# Patient Record
Sex: MALE | Race: WHITE | NOT HISPANIC OR LATINO | Employment: OTHER | URBAN - METROPOLITAN AREA
[De-identification: names, ages, dates, MRNs, and addresses within clinical notes are randomized per-mention and may not be internally consistent; named-entity substitution may affect disease eponyms.]

---

## 2017-09-19 ENCOUNTER — TELEPHONE (OUTPATIENT)
Dept: PRIMARY CARE CLINIC | Facility: CLINIC | Age: 82
End: 2017-09-19

## 2017-09-19 NOTE — TELEPHONE ENCOUNTER
Scheduled patient for tomorrow at 11:45 and cancelled his other appt on 9/29/17.   Patient notified.

## 2017-09-19 NOTE — TELEPHONE ENCOUNTER
"----- Message from Gale Solano sent at 9/19/2017 10:14 AM CDT -----  Contact: patient  Patient, [phone], is calling regarding his 9/29/2017 appointment wit" and he fell in the kitchen.  Patient is having balance issues. Patient would like to get in to see Dr. Gonzales sooner than his scheduled appointment.  Please advise.  Thanks!  "

## 2017-09-20 ENCOUNTER — OFFICE VISIT (OUTPATIENT)
Dept: PRIMARY CARE CLINIC | Facility: CLINIC | Age: 82
End: 2017-09-20
Payer: MEDICARE

## 2017-09-20 VITALS
TEMPERATURE: 98 F | DIASTOLIC BLOOD PRESSURE: 71 MMHG | OXYGEN SATURATION: 99 % | HEIGHT: 75 IN | BODY MASS INDEX: 24.28 KG/M2 | WEIGHT: 195.31 LBS | SYSTOLIC BLOOD PRESSURE: 180 MMHG | RESPIRATION RATE: 18 BRPM | HEART RATE: 83 BPM

## 2017-09-20 DIAGNOSIS — Z23 NEED FOR VACCINATION: ICD-10-CM

## 2017-09-20 DIAGNOSIS — S16.1XXA CERVICAL STRAIN, INITIAL ENCOUNTER: ICD-10-CM

## 2017-09-20 DIAGNOSIS — I95.1 ORTHOSTASIS: Primary | ICD-10-CM

## 2017-09-20 DIAGNOSIS — I10 ESSENTIAL HYPERTENSION, BENIGN: ICD-10-CM

## 2017-09-20 PROBLEM — R73.01 FASTING HYPERGLYCEMIA: Status: ACTIVE | Noted: 2017-09-20

## 2017-09-20 PROBLEM — E78.00 HYPERCHOLESTEROLEMIA: Status: ACTIVE | Noted: 2017-09-20

## 2017-09-20 PROCEDURE — 1159F MED LIST DOCD IN RCRD: CPT | Mod: S$GLB,,, | Performed by: FAMILY MEDICINE

## 2017-09-20 PROCEDURE — 99214 OFFICE O/P EST MOD 30 MIN: CPT | Mod: S$PBB,,, | Performed by: FAMILY MEDICINE

## 2017-09-20 PROCEDURE — G0008 ADMIN INFLUENZA VIRUS VAC: HCPCS | Mod: PBBFAC,PN

## 2017-09-20 PROCEDURE — 99999 PR PBB SHADOW E&M-EST. PATIENT-LVL IV: CPT | Mod: PBBFAC,,, | Performed by: FAMILY MEDICINE

## 2017-09-20 PROCEDURE — 99214 OFFICE O/P EST MOD 30 MIN: CPT | Mod: PBBFAC,25,PN | Performed by: FAMILY MEDICINE

## 2017-09-20 RX ORDER — METHOCARBAMOL 750 MG/1
500 TABLET, FILM COATED ORAL 2 TIMES DAILY
COMMUNITY
End: 2017-09-20 | Stop reason: SDUPTHER

## 2017-09-20 RX ORDER — ASPIRIN 81 MG/1
81 TABLET ORAL DAILY
COMMUNITY
End: 2018-07-05 | Stop reason: ALTCHOICE

## 2017-09-20 RX ORDER — AMLODIPINE BESYLATE 10 MG/1
10 TABLET ORAL DAILY
COMMUNITY
End: 2018-01-25 | Stop reason: SDUPTHER

## 2017-09-20 RX ORDER — METHOCARBAMOL 750 MG/1
750 TABLET, FILM COATED ORAL 2 TIMES DAILY PRN
Qty: 30 TABLET | Refills: 1 | Status: SHIPPED | OUTPATIENT
Start: 2017-09-20 | End: 2017-10-19

## 2017-09-20 RX ORDER — LISINOPRIL 10 MG/1
10 TABLET ORAL DAILY
Qty: 90 TABLET | Refills: 1 | Status: SHIPPED | OUTPATIENT
Start: 2017-09-20 | End: 2021-06-25

## 2017-09-20 NOTE — PROGRESS NOTES
"Subjective:       Patient ID: Jon Chandler is a 89 y.o. male.    Chief Complaint: Dizziness (pt feel on Monday, states he is having shoulder pain )    When got up Monday morning, felt a little weak and dizzy. Slipped or lost balance in kitchen later in the morning, landed on buttocks and hit elbow. No head trauma, no LOC. Has been having left shoulder pain past few days since fall, still getting dizzy when stands from seated position after sitting for a while. Dizzy sensation passes after a few moments.      Review of Systems   Constitutional: Negative for appetite change, chills and fever.   Respiratory: Negative for shortness of breath.    Cardiovascular: Negative for chest pain and palpitations.   Gastrointestinal: Negative for diarrhea, nausea and vomiting.   Genitourinary: Negative for difficulty urinating.   Neurological: Positive for dizziness. Negative for weakness and numbness.       Objective:      Vitals:    09/20/17 1235 09/20/17 1320 09/20/17 1321 09/20/17 1323   BP: (!) 159/75 (!) 150/87 (!) 166/85 (!) 180/71   BP Location: Left arm Right arm Right arm Right arm   Patient Position: Sitting Lying Sitting Standing   BP Method: Medium (Automatic) Medium (Automatic) Medium (Automatic) Medium (Automatic)   Pulse: 78 80 79 83   Resp: 18      Temp: 97.5 °F (36.4 °C)      TempSrc: Oral      SpO2: 99%      Weight: 88.6 kg (195 lb 4.8 oz)      Height: 6' 3" (1.905 m)        Physical Exam   Constitutional: He is oriented to person, place, and time. He appears well-developed and well-nourished.   HENT:   Head: Normocephalic and atraumatic.   Nose: Nose normal.   Mouth/Throat: Mucous membranes are normal.   Eyes: EOM are normal. Pupils are equal, round, and reactive to light.   Neck: Neck supple. No JVD present. Muscular tenderness present. Decreased range of motion present.       Cardiovascular: Normal rate, regular rhythm, normal heart sounds and normal pulses.    Pulmonary/Chest: Effort normal and breath " sounds normal.   Abdominal: Soft. Normal appearance and bowel sounds are normal. He exhibits no distension. There is no tenderness.   Neurological: He is alert and oriented to person, place, and time.   Skin: Skin is warm and dry.   Psychiatric: He has a normal mood and affect. His speech is normal.   Vitals reviewed.      Assessment:       1. Orthostasis    2. Need for vaccination    3. Cervical strain, initial encounter    4. Essential hypertension, benign        Plan:       Orthostasis  -     POCT EKG 12-LEAD (NOT FOR [facility] USE)    Need for vaccination  -     Influenza - High Dose (65+) (PF) (IM)    Cervical strain, initial encounter  -     methocarbamol (ROBAXIN) 750 MG Tab; Take 1 tablet (750 mg total) by mouth 2 (two) times daily as needed (muscle spasm).  Dispense: 30 tablet; Refill: 1    Essential hypertension, benign  -     lisinopril 10 MG tablet; Take 1 tablet (10 mg total) by mouth once daily.  Dispense: 90 tablet; Refill: 1      Medication List with Changes/Refills   New Medications    LISINOPRIL 10 MG TABLET    Take 1 tablet (10 mg total) by mouth once daily.   Current Medications    AMLODIPINE (NORVASC) 10 MG TABLET    Take 10 mg by mouth once daily.    ASPIRIN (ECOTRIN) 81 MG EC TABLET    Take 81 mg by mouth once daily.    MULTIVIT-MIN/FA/LYCOPEN/LUTEIN (CENTRUM SILVER MEN ORAL)    Take by mouth once daily.   Changed and/or Refilled Medications    Modified Medication Previous Medication    METHOCARBAMOL (ROBAXIN) 750 MG TAB methocarbamol (ROBAXIN) 750 MG Tab       Take 1 tablet (750 mg total) by mouth 2 (two) times daily as needed (muscle spasm).    Take 500 mg by mouth 2 (two) times daily.

## 2017-10-16 ENCOUNTER — TELEPHONE (OUTPATIENT)
Dept: PRIMARY CARE CLINIC | Facility: CLINIC | Age: 82
End: 2017-10-16

## 2017-10-19 ENCOUNTER — OFFICE VISIT (OUTPATIENT)
Dept: PRIMARY CARE CLINIC | Facility: CLINIC | Age: 82
End: 2017-10-19
Payer: MEDICARE

## 2017-10-19 VITALS
BODY MASS INDEX: 24.79 KG/M2 | SYSTOLIC BLOOD PRESSURE: 170 MMHG | DIASTOLIC BLOOD PRESSURE: 79 MMHG | RESPIRATION RATE: 18 BRPM | WEIGHT: 198.31 LBS | TEMPERATURE: 97 F | HEART RATE: 81 BPM

## 2017-10-19 DIAGNOSIS — Z98.890 HISTORY OF REPAIR OF LEFT ROTATOR CUFF: ICD-10-CM

## 2017-10-19 DIAGNOSIS — R42 DIZZINESS: ICD-10-CM

## 2017-10-19 DIAGNOSIS — S16.1XXS CERVICAL STRAIN, ACUTE, SEQUELA: Primary | ICD-10-CM

## 2017-10-19 DIAGNOSIS — Z98.1 HX OF FUSION OF CERVICAL SPINE: ICD-10-CM

## 2017-10-19 PROCEDURE — 99213 OFFICE O/P EST LOW 20 MIN: CPT | Mod: S$PBB,,, | Performed by: NURSE PRACTITIONER

## 2017-10-19 PROCEDURE — 99213 OFFICE O/P EST LOW 20 MIN: CPT | Mod: PBBFAC,PN | Performed by: NURSE PRACTITIONER

## 2017-10-19 PROCEDURE — 99999 PR PBB SHADOW E&M-EST. PATIENT-LVL III: CPT | Mod: PBBFAC,,, | Performed by: NURSE PRACTITIONER

## 2017-10-19 NOTE — PROGRESS NOTES
"Chief Complaint  Chief Complaint   Patient presents with    Shoulder Pain       HPI  Jon Chandler is a 89 y.o. male with multiple medical diagnoses as listed in the medical history and problem list that presents for left shoulder pain.  Patient is new to me but known to the clinic. Presents with continued left "shoulder pain" for approximately 3 weeks. Pain described as occasional, aching, located near the cervical and upper back area. Pain worse in the morning. Improves with movement and use of the arm. H/o cervical fusion and left rotator cuff repair. Previously prescribed Robaxin but states without alleviation of symptoms. Patient has been putting heat on back and topical inocencio beck with much relief but states that it is difficult to put on by himself. Complaining of occasional loss of balance when getting up. Denies dizziness. States he just feels like he is going to fall. Correlating loss of balance with pain because they occurred together. Occurred before he started robaxin. HTN today in clinic. Has not taken BP medications today. Does not check BP at home but states "it is always high when he comes in the office". Denies headaches, blurred vision, syncope.      PAST MEDICAL HISTORY:  Past Medical History:   Diagnosis Date    Elevated glucose     Hyperlipidemia     Hypertension        PAST SURGICAL HISTORY:  Past Surgical History:   Procedure Laterality Date    CERVICAL FUSION      CHOLECYSTECTOMY      PROSTATECTOMY      ROTATOR CUFF REPAIR         SOCIAL HISTORY:  Social History     Social History    Marital status:      Spouse name: N/A    Number of children: N/A    Years of education: N/A     Occupational History    Not on file.     Social History Main Topics    Smoking status: Never Smoker    Smokeless tobacco: Never Used    Alcohol use Yes      Comment: social     Drug use: No    Sexual activity: Not on file     Other Topics Concern    Not on file     Social History Narrative    " No narrative on file       FAMILY HISTORY:  Family History   Problem Relation Age of Onset    Family history unknown: Yes       ALLERGIES AND MEDICATIONS: updated and reviewed.  Review of patient's allergies indicates:   Allergen Reactions    Codeine      Current Outpatient Prescriptions   Medication Sig Dispense Refill    amlodipine (NORVASC) 10 MG tablet Take 10 mg by mouth once daily.      aspirin (ECOTRIN) 81 MG EC tablet Take 81 mg by mouth once daily.      lisinopril 10 MG tablet Take 1 tablet (10 mg total) by mouth once daily. 90 tablet 1    MULTIVIT-MIN/FA/LYCOPEN/LUTEIN (CENTRUM SILVER MEN ORAL) Take by mouth once daily.       No current facility-administered medications for this visit.          ROS  Review of Systems   Constitutional: Negative for chills, fatigue and fever.   HENT: Negative for congestion, rhinorrhea, sinus pressure and sore throat.    Respiratory: Negative for cough, chest tightness and shortness of breath.    Cardiovascular: Negative for chest pain and palpitations.   Gastrointestinal: Negative for abdominal pain, blood in stool, diarrhea, nausea and vomiting.   Genitourinary: Negative for dysuria, frequency, hematuria and urgency.   Musculoskeletal: Positive for arthralgias, back pain, neck pain and neck stiffness. Negative for joint swelling.   Skin: Negative for rash and wound.   Neurological: Positive for light-headedness. Negative for dizziness, syncope and headaches.   Psychiatric/Behavioral: Negative for dysphoric mood and sleep disturbance. The patient is not nervous/anxious.            PHYSICAL EXAM  Vitals:    10/19/17 0810   BP: (!) 170/79   BP Location: Right arm   Patient Position: Sitting   BP Method: Medium (Automatic)   Pulse: 81   Resp: 18   Temp: 97 °F (36.1 °C)   TempSrc: Oral   Weight: 89.9 kg (198 lb 4.8 oz)    Body mass index is 24.79 kg/m².  Weight: 89.9 kg (198 lb 4.8 oz)         Physical Exam   Constitutional: He is oriented to person, place, and time. He  appears well-developed and well-nourished.   HENT:   Head: Normocephalic.   Eyes: Conjunctivae are normal.   Neck: Muscular tenderness present. Decreased range of motion present.       Cardiovascular: Normal rate, regular rhythm and normal pulses.    Murmur heard.   Systolic murmur is present with a grade of 2/6   Pulses:       Radial pulses are 2+ on the right side, and 2+ on the left side.   Pulmonary/Chest: Effort normal and breath sounds normal. He has no wheezes.   Abdominal: Soft. Bowel sounds are normal. There is no tenderness.   Musculoskeletal: He exhibits no edema.        Left shoulder: He exhibits crepitus. He exhibits normal range of motion, no tenderness and no swelling.        Arms:  Lymphadenopathy:     He has no cervical adenopathy.   Neurological: He is alert and oriented to person, place, and time. He has normal strength. No cranial nerve deficit. He displays a negative Romberg sign.   Skin: Skin is warm and dry. No rash noted.   Psychiatric: He has a normal mood and affect.         Health Maintenance       Date Due Completion Date    TETANUS VACCINE 12/25/1945 ---    Zoster Vaccine 12/25/1987 ---    Pneumococcal (65+) (1 of 2 - PCV13) 12/25/1992 ---    Lipid Panel 05/01/2013 5/1/2008            Assessment & Plan    Jon was seen today for shoulder pain.    Diagnoses and all orders for this visit:    Cervical strain, acute, sequela       - Patient refusing to take any additional medication at this time.        - Pain improves with use, guarding noted. Encouraged physical therapy. Patient refused stating that he would not benefit. Mobility, left arm use encouraged.        - Recommended salon pas patches OTC for pain relief.        - followup with Dr. Gonzales if no improvement.     Hx of fusion of cervical spine       - Patient not interested in PT at this time    Dizziness       - Encouraged to exercise fall precautions while ambulating       - Blood pressure log at home       - Patient not  interested in Rx treatment at this time.      Follow-up: Return if symptoms worsen or fail to improve.

## 2018-01-25 RX ORDER — AMLODIPINE BESYLATE 10 MG/1
TABLET ORAL
Qty: 90 TABLET | Refills: 1 | Status: SHIPPED | OUTPATIENT
Start: 2018-01-25 | End: 2021-06-25

## 2018-07-05 ENCOUNTER — OFFICE VISIT (OUTPATIENT)
Dept: PRIMARY CARE CLINIC | Facility: CLINIC | Age: 83
End: 2018-07-05
Payer: MEDICARE

## 2018-07-05 VITALS
TEMPERATURE: 98 F | HEART RATE: 92 BPM | OXYGEN SATURATION: 99 % | RESPIRATION RATE: 18 BRPM | WEIGHT: 196.31 LBS | BODY MASS INDEX: 24.41 KG/M2 | DIASTOLIC BLOOD PRESSURE: 90 MMHG | SYSTOLIC BLOOD PRESSURE: 165 MMHG | HEIGHT: 75 IN

## 2018-07-05 DIAGNOSIS — S16.1XXS CERVICAL STRAIN, SEQUELA: Primary | ICD-10-CM

## 2018-07-05 PROCEDURE — 99213 OFFICE O/P EST LOW 20 MIN: CPT | Mod: PBBFAC,PN | Performed by: NURSE PRACTITIONER

## 2018-07-05 PROCEDURE — 99214 OFFICE O/P EST MOD 30 MIN: CPT | Mod: S$PBB,,, | Performed by: NURSE PRACTITIONER

## 2018-07-05 PROCEDURE — 99999 PR PBB SHADOW E&M-EST. PATIENT-LVL III: CPT | Mod: PBBFAC,,, | Performed by: NURSE PRACTITIONER

## 2018-07-05 RX ORDER — METHOCARBAMOL 500 MG/1
500 TABLET, FILM COATED ORAL 4 TIMES DAILY
Qty: 28 TABLET | Refills: 0 | Status: SHIPPED | OUTPATIENT
Start: 2018-07-05 | End: 2018-07-15

## 2018-07-05 NOTE — PROGRESS NOTES
Chief Complaint  Chief Complaint   Patient presents with    Shoulder Pain     Left        HPI  Jon Chandler is a 89 y.o. male with multiple medical diagnoses as listed in the medical history and problem list that presents for bilateral neck pain.    Patient previously seen on 2 occasions since September 2017 complaining of bilateral neck pain.  Pain described as intermittent, 5/10.  Aching.  Describes a sensation of popping when turning head laterally.  Worse in the morning.  Improves with movement.  Has been treated with salon possible patches with no improvement.  Has been treated with Robaxin in the past with improvement but is currently at medication.  Denies fever or chills.        PAST MEDICAL HISTORY:  Past Medical History:   Diagnosis Date    Elevated glucose     Hyperlipidemia     Hypertension        PAST SURGICAL HISTORY:  Past Surgical History:   Procedure Laterality Date    CERVICAL FUSION      CHOLECYSTECTOMY      PROSTATECTOMY      ROTATOR CUFF REPAIR         SOCIAL HISTORY:  Social History     Social History    Marital status:      Spouse name: N/A    Number of children: N/A    Years of education: N/A     Occupational History    Not on file.     Social History Main Topics    Smoking status: Never Smoker    Smokeless tobacco: Never Used    Alcohol use Yes      Comment: social     Drug use: No    Sexual activity: Not on file     Other Topics Concern    Not on file     Social History Narrative    No narrative on file       FAMILY HISTORY:  Family History   Problem Relation Age of Onset    Family history unknown: Yes       ALLERGIES AND MEDICATIONS: updated and reviewed.  Review of patient's allergies indicates:   Allergen Reactions    Codeine      Current Outpatient Prescriptions   Medication Sig Dispense Refill    amLODIPine (NORVASC) 10 MG tablet TAKE ONE TABLET BY MOUTH ONCE DAILY 90 tablet 1    lisinopril 10 MG tablet Take 1 tablet (10 mg total) by mouth once daily. 90  "tablet 1    MULTIVIT-MIN/FA/LYCOPEN/LUTEIN (CENTRUM SILVER MEN ORAL) Take by mouth once daily.      methocarbamol (ROBAXIN) 500 MG Tab Take 1 tablet (500 mg total) by mouth 4 (four) times daily. for 10 days 28 tablet 0     No current facility-administered medications for this visit.          ROS  Review of Systems   Constitutional: Negative for chills, fatigue and fever.   HENT: Negative for congestion, rhinorrhea, sinus pressure and sore throat.    Respiratory: Negative for cough, chest tightness and shortness of breath.    Cardiovascular: Negative for chest pain and palpitations.   Gastrointestinal: Negative for abdominal pain, blood in stool, diarrhea, nausea and vomiting.   Genitourinary: Negative for dysuria, frequency, hematuria and urgency.   Musculoskeletal: Positive for myalgias, neck pain and neck stiffness. Negative for arthralgias and back pain.   Skin: Negative for rash and wound.   Neurological: Negative for dizziness and headaches.   Psychiatric/Behavioral: Negative for dysphoric mood and sleep disturbance. The patient is not nervous/anxious.          PHYSICAL EXAM  Vitals:    07/05/18 0901   BP: (!) 165/90   BP Location: Right arm   Patient Position: Sitting   BP Method: Medium (Automatic)   Pulse: 92   Resp: 18   Temp: 97.6 °F (36.4 °C)   TempSrc: Oral   SpO2: 99%   Weight: 89 kg (196 lb 4.8 oz)   Height: 6' 3" (1.905 m)    Body mass index is 24.54 kg/m².  Weight: 89 kg (196 lb 4.8 oz)   Height: 6' 3" (190.5 cm)     Physical Exam   Constitutional: He is oriented to person, place, and time. He appears well-developed and well-nourished.   HENT:   Head: Normocephalic.   Right Ear: Tympanic membrane normal.   Left Ear: Tympanic membrane normal.   Mouth/Throat: Uvula is midline, oropharynx is clear and moist and mucous membranes are normal.   Eyes: Conjunctivae are normal.   Cardiovascular: Normal rate, regular rhythm, normal heart sounds and normal pulses.    No murmur heard.  Pulses:       Radial " pulses are 2+ on the right side, and 2+ on the left side.   No LE swelling noted   Pulmonary/Chest: Effort normal and breath sounds normal. He has no wheezes.   Abdominal: Soft. Bowel sounds are normal. There is no tenderness.   Musculoskeletal: He exhibits no edema.        Arms:  Lymphadenopathy:     He has no cervical adenopathy.   Neurological: He is alert and oriented to person, place, and time.   Skin: Skin is warm and dry. No rash noted.   Psychiatric: He has a normal mood and affect.         Health Maintenance       Date Due Completion Date    TETANUS VACCINE 12/25/1945 ---    Zoster Vaccine 12/25/1987 ---    Pneumococcal (65+) (1 of 2 - PCV13) 12/25/1992 ---    Influenza Vaccine 08/01/2018 9/20/2017    Lipid Panel 06/14/2021 6/14/2016            Assessment & Plan    Jon was seen today for shoulder pain.    Diagnoses and all orders for this visit:    Cervical strain, sequela    Other orders  -     methocarbamol (ROBAXIN) 500 MG Tab; Take 1 tablet (500 mg total) by mouth 4 (four) times daily. for 10 days  Encouraged patient to try physical therapy to learn an exercise routine that may minimize his pain.  He is not interested at this time.        Follow-up: Follow-up if symptoms worsen or fail to improve.

## 2019-08-02 ENCOUNTER — PES CALL (OUTPATIENT)
Dept: ADMINISTRATIVE | Facility: CLINIC | Age: 84
End: 2019-08-02

## 2021-06-24 ENCOUNTER — TELEPHONE (OUTPATIENT)
Dept: PRIMARY CARE CLINIC | Facility: CLINIC | Age: 86
End: 2021-06-24

## 2021-06-25 ENCOUNTER — OFFICE VISIT (OUTPATIENT)
Dept: PRIMARY CARE CLINIC | Facility: CLINIC | Age: 86
End: 2021-06-25
Payer: MEDICARE

## 2021-06-25 ENCOUNTER — TELEPHONE (OUTPATIENT)
Dept: PRIMARY CARE CLINIC | Facility: CLINIC | Age: 86
End: 2021-06-25

## 2021-06-25 VITALS — RESPIRATION RATE: 18 BRPM | OXYGEN SATURATION: 97 % | HEART RATE: 86 BPM

## 2021-06-25 DIAGNOSIS — F02.81 ALZHEIMER'S DEMENTIA WITH BEHAVIORAL DISTURBANCE, UNSPECIFIED TIMING OF DEMENTIA ONSET: Primary | ICD-10-CM

## 2021-06-25 DIAGNOSIS — I10 HYPERTENSION, UNSPECIFIED TYPE: ICD-10-CM

## 2021-06-25 DIAGNOSIS — Z98.1 HX OF FUSION OF CERVICAL SPINE: Primary | ICD-10-CM

## 2021-06-25 DIAGNOSIS — Z98.890 HISTORY OF REPAIR OF LEFT ROTATOR CUFF: ICD-10-CM

## 2021-06-25 DIAGNOSIS — G30.9 ALZHEIMER'S DEMENTIA WITH BEHAVIORAL DISTURBANCE, UNSPECIFIED TIMING OF DEMENTIA ONSET: Primary | ICD-10-CM

## 2021-06-25 DIAGNOSIS — R32 URINARY INCONTINENCE, UNSPECIFIED TYPE: ICD-10-CM

## 2021-06-25 DIAGNOSIS — G30.9 ALZHEIMER'S DEMENTIA WITH BEHAVIORAL DISTURBANCE, UNSPECIFIED TIMING OF DEMENTIA ONSET: ICD-10-CM

## 2021-06-25 DIAGNOSIS — F02.81 ALZHEIMER'S DEMENTIA WITH BEHAVIORAL DISTURBANCE, UNSPECIFIED TIMING OF DEMENTIA ONSET: ICD-10-CM

## 2021-06-25 PROBLEM — F03.90 DEMENTIA: Status: ACTIVE | Noted: 2021-06-25

## 2021-06-25 PROCEDURE — 99213 OFFICE O/P EST LOW 20 MIN: CPT | Mod: PBBFAC,PN | Performed by: FAMILY MEDICINE

## 2021-06-25 PROCEDURE — 99214 PR OFFICE/OUTPT VISIT, EST, LEVL IV, 30-39 MIN: ICD-10-PCS | Mod: S$PBB,,, | Performed by: FAMILY MEDICINE

## 2021-06-25 PROCEDURE — 99999 PR PBB SHADOW E&M-EST. PATIENT-LVL III: CPT | Mod: PBBFAC,,, | Performed by: FAMILY MEDICINE

## 2021-06-25 PROCEDURE — 99214 OFFICE O/P EST MOD 30 MIN: CPT | Mod: S$PBB,,, | Performed by: FAMILY MEDICINE

## 2021-06-25 PROCEDURE — 99999 PR PBB SHADOW E&M-EST. PATIENT-LVL III: ICD-10-PCS | Mod: PBBFAC,,, | Performed by: FAMILY MEDICINE

## 2021-06-25 RX ORDER — LOSARTAN POTASSIUM 50 MG/1
50 TABLET ORAL DAILY
Qty: 90 TABLET | Refills: 3 | Status: SHIPPED | OUTPATIENT
Start: 2021-06-25 | End: 2022-06-25

## 2021-06-28 ENCOUNTER — TELEPHONE (OUTPATIENT)
Dept: PRIMARY CARE CLINIC | Facility: CLINIC | Age: 86
End: 2021-06-28

## 2021-06-29 ENCOUNTER — TELEPHONE (OUTPATIENT)
Dept: PRIMARY CARE CLINIC | Facility: CLINIC | Age: 86
End: 2021-06-29

## 2021-06-29 DIAGNOSIS — G30.9 ALZHEIMER'S DEMENTIA WITH BEHAVIORAL DISTURBANCE, UNSPECIFIED TIMING OF DEMENTIA ONSET: Primary | ICD-10-CM

## 2021-06-29 DIAGNOSIS — F02.81 ALZHEIMER'S DEMENTIA WITH BEHAVIORAL DISTURBANCE, UNSPECIFIED TIMING OF DEMENTIA ONSET: Primary | ICD-10-CM
